# Patient Record
Sex: MALE | Race: ASIAN | URBAN - METROPOLITAN AREA
[De-identification: names, ages, dates, MRNs, and addresses within clinical notes are randomized per-mention and may not be internally consistent; named-entity substitution may affect disease eponyms.]

---

## 2020-08-06 ENCOUNTER — VIRTUAL VISIT (OUTPATIENT)
Dept: FAMILY MEDICINE | Facility: CLINIC | Age: 30
End: 2020-08-06
Payer: COMMERCIAL

## 2020-08-06 VITALS
RESPIRATION RATE: 12 BRPM | TEMPERATURE: 98.3 F | DIASTOLIC BLOOD PRESSURE: 79 MMHG | SYSTOLIC BLOOD PRESSURE: 114 MMHG | HEART RATE: 78 BPM | OXYGEN SATURATION: 98 %

## 2020-08-06 DIAGNOSIS — Z20.822 EXPOSURE TO COVID-19 VIRUS: Primary | ICD-10-CM

## 2020-08-06 DIAGNOSIS — Z20.822 SUSPECTED 2019 NOVEL CORONAVIRUS INFECTION: ICD-10-CM

## 2020-08-06 DIAGNOSIS — R05.9 COUGH: Primary | ICD-10-CM

## 2020-08-06 LAB
COVID-19 VIRUS PCR TO U OF MN - SOURCE: NORMAL
SARS-COV-2 RNA SPEC QL NAA+PROBE: NOT DETECTED

## 2020-08-06 RX ORDER — IBUPROFEN 200 MG
200 TABLET ORAL EVERY 4 HOURS PRN
COMMUNITY

## 2020-08-06 NOTE — PROGRESS NOTES
COVID-19 PCR test completed. Patient handout For Patients Who Have Been Tested for Covid-19 (Coronavirus) was given to the patient, which includes test result notification process.    COVID-19 PCR test completed. Patient handout For Patients Who Have Been Tested for Covid-19 (Coronavirus) was given to the patient, which includes test result notification process.

## 2020-08-06 NOTE — LETTER
August 11, 2020      Theresa Capone  [address]  [place] [address]        Dear ,    We are writing to inform you of your test results.        Resulted Orders   COVID-19 Virus PCR MRF ([facility])   Result Value Ref Range    COVID-19 Virus PCR to [facility] - Source Nasopharyngeal     COVID-19 Virus PCR to [facility] - Result Not Detected       Comment:      Collection of multiple specimens from the same patient may be necessary to  detect the virus. The possibility of a false negative should be considered if  the patient's recent exposure or clinical presentation suggests 2019 nCOV  infection and diagnostic tests for other causes of illness are negative.   Repeat  testing may be considered in this setting.  Viral RNA was extracted via a validated method and subsequently underwent  single step reverse transcriptase-real time polymerase chain reaction using  primers to the CDC specified N1,N2 gene targets of CoV2 and human RNP as an  internal control.  A negative result does not rule out the presence of real-time PCR inhibitors   in  the specimen or COVID-19 RNA in concentrations below the limit of detection of  the assay. The possibility of a false negative should be considered if the  patients recent exposure or clinical presentation suggests COVID-19.   Additional  testing or repeat testing requires consultation with the laboratory.  Andrey  opharyngeal specimen is the preferred choice for swab-based SARS CoV2  testing. When collection of a nasopharyngeal swab is not possible the   following  are acceptable alternatives:  an oropharyngeal (OP) specimen collected by a healthcare professional, or a  nasal mid-turbinate (NMT) swab collected by a healthcare professional or by  onsite self-collection (using a flocked tapered swab), or an anterior nares  specimen collected by a healthcare professional or by onsite self-collection  (using a round foam swab). (Centers for Disease Control)  Testing performed by [facility]  [facility], Room [address]210, [address]  [address] [place] [address], [place] [address]. This test was developed and its  performance characteristics determined by the [facility]. It has not been cleared or approved by the FDA.  The laboratory is regulated under the Clinical Laboratory Improvement  Amendments of 1988 (CLIA-88) as qualified to perform high-complexity testing.  This test i  s used for clinical purposes. It should not be regarded as  investigational or for research.  Performed and/or entered by:  [place] [address]       Narrative    Test performed by:  [facility] [employer] [facility]  [address], [place] [address]       If you have any questions or concerns, please call the clinic at the number listed above.       Sincerely,        Cynthia Wood MD

## 2020-08-06 NOTE — PROGRESS NOTES
"Family Medicine Telephone Visit Note               Telephone Visit Consent   Patient was verbally read the following and verbal consent was obtained.    \"Telephone visits are billed at different rates depending on your insurance coverage. During this emergency period, for some insurers they may be billed the same as an in-person visit.  Please reach out to your insurance provider with any questions.  If during the course of the call the physician/provider feels a telephone visit is not appropriate, you will not be charged for this service.\"    Name person giving consent:  Patient   Date verbal consent given:  8/6/2020  Time verbal consent given:  10:27 AM           Chief Complaint   Patient presents with     Cough     Sore throat.  No fever                     HPI   Patients name: Theresa  Appointment start time:  10:28 AM    Theresa Capone is a 29 year old male with no significant past medical history presenting via telephone visit with concerns for COVID-19. He reports that he has had a sore throat with coughing and a fever for the past two weeks along with nasal congestion. His son, who is 7 months old has similar symptoms. No known contact with COVID-19. Wife told him to quarantine and called to test. Has been staying out of work for the last two weeks. Works in [employer] which is less than 30 people. Able to eat and drink. Temperature has been around 98F. Coughing has been improving. No trouble breathing. No seasonal allergies. Has been trying tylenol and no improvement.        Current Outpatient Medications   Medication Sig Dispense Refill     ibuprofen (ADVIL/MOTRIN) 200 MG tablet Take 200 mg by mouth every 4 hours as needed for mild pain       No Known Allergies             Physical Exam:     There were no vitals taken for this visit.  There is no height or weight on file to calculate BMI.    Exam:  Constitutional: healthy, alert and no distress  Lungs: Sounds congested on the phone  Psychiatric: mentation appears " normal and affect normal/bright            Assessment and Plan   1. Cough  2. Suspected 2019 novel coronavirus infection  Has had URI symptoms ongoing for the past two weeks. Cough is improving. No known fevers. Needs COVID-19 testing before going back to work. Discussed that this may be likely due to another type of virus or be seasonal allergies given a bad ragweed/pollen season. Counseled on supportive cares. Will be seen in [facility] at 1:30pm today.   - Symptomatic COVID-19 Virus (Coronavirus) by PCR    Refilled medications that would be required in the next 3 months.     After Visit Information:  Patient declined AVS     No follow-ups on file.    Appointment end time: 10:39 AM  This is a telephone visit that took 11 minutes.      Clinician location:  [facility]     Cynthia Wood MD PGY3  [facility]    I precepted today with Dr. Rama Luke.

## 2020-08-06 NOTE — PROGRESS NOTES
Preceptor Attestation:    I talked to the patient on the phone and discussed the patient with the resident. I have verified the content of the note, which accurately reflects my assessment of the patient and the plan of care.   Supervising Physician:  Rama Luke MD.

## 2020-08-11 NOTE — RESULT ENCOUNTER NOTE
Negative results relayed to patient over the phone. Please send a copy of the results to patient. He needs them for work.    Thank you,  Cynthia Wood MD PGY3  [facility]

## 2020-09-09 ENCOUNTER — VIRTUAL VISIT (OUTPATIENT)
Dept: FAMILY MEDICINE | Facility: CLINIC | Age: 30
End: 2020-09-09
Payer: COMMERCIAL

## 2020-09-09 VITALS
RESPIRATION RATE: 14 BRPM | TEMPERATURE: 98.8 F | HEART RATE: 56 BPM | DIASTOLIC BLOOD PRESSURE: 76 MMHG | OXYGEN SATURATION: 100 % | SYSTOLIC BLOOD PRESSURE: 115 MMHG

## 2020-09-09 VITALS — WEIGHT: 170 LBS | BODY MASS INDEX: 27.32 KG/M2 | HEIGHT: 66 IN

## 2020-09-09 DIAGNOSIS — Z20.822 SUSPECTED COVID-19 VIRUS INFECTION: ICD-10-CM

## 2020-09-09 DIAGNOSIS — Z20.822 SUSPECTED COVID-19 VIRUS INFECTION: Primary | ICD-10-CM

## 2020-09-09 LAB
COVID-19 VIRUS PCR TO U OF MN - SOURCE: NORMAL
SARS-COV-2 RNA SPEC QL NAA+PROBE: NOT DETECTED

## 2020-09-09 ASSESSMENT — MIFFLIN-ST. JEOR: SCORE: 1678.86

## 2020-09-09 NOTE — ADDENDUM NOTE
Addended by: GILLIAN LUNSFORD on: 9/9/2020 01:06 PM     Modules accepted: Orders     no abrasions, no jaundice, no lesions, no pruritis, and no rashes.

## 2020-09-09 NOTE — PROGRESS NOTES
Preceptor attestation:    I discussed the patient with the resident, and I spoke to the patient by telephone. I have verified the content of the note, which accurately reflects my assessment of the patient and the plan of care.    Supervising physician: Almita Michele MD  [facility]

## 2020-09-09 NOTE — PROGRESS NOTES
"Family Medicine Telephone Visit Note           Telephone Visit Consent   Patient was verbally read the following and verbal consent was obtained.    \"Telephone visits are billed at different rates depending on your insurance coverage. During this emergency period, for some insurers they may be billed the same as an in-person visit.  Please reach out to your insurance provider with any questions.  If during the course of the call the physician/provider feels a telephone visit is not appropriate, you will not be charged for this service.\"    Name person giving consent:  Patient   Date verbal consent given:  9/9/2020  Time verbal consent given:  9:20 AM       Chief Complaint   Patient presents with     Fever     started last Wednesday around 5pm, has really bad cold sweats     Headache     a bad migraine as well             HPI   Patients name: Theresa  Appointment start time:  9:27 AM    Social Determinants of Health Screening: Not addressed this visit     Fevers, chills and night sweats for 1 week. No cough, wheezing or SOB. No GI symptoms. No rhinorrhea. Does endorse myalgias. Works at a [employer]. No known COVID 19 exposure. Did have a negative test one month ago. Has been quarantining.     Current Outpatient Medications   Medication Sig Dispense Refill     ibuprofen (ADVIL/MOTRIN) 200 MG tablet Take 200 mg by mouth every 4 hours as needed for mild pain       No Known Allergies           Review of Systems:     Constitutional, HEENT, cardiovascular, pulmonary, GI, , musculoskeletal, neuro, skin, endocrine and psych systems are negative, except as otherwise noted.         Physical Exam:     There were no vitals taken for this visit.  There is no height or weight on file to calculate BMI.    Exam:  Constitutional: alert and no distress  Pulm: speaking in full sentences. No cough or wheezing  Psychiatric: mentation appears normal and affect normal/bright     Results from the last 24 hoursNo results found for this or any " previous visit (from the past 24 hour(s)).        Assessment and Plan   Theresa was seen today for fever and headache.    Diagnoses and all orders for this visit:    Suspected COVID-19 virus infection  1 week of fevers, chills, sweats and myalgias. No known exposure, but possible at work. Testing to be done later today.   -     COVID-19 Virus PCR MRF ([facility])    Refilled medications that would be required in the next 3 months.     After Visit Information:  Patient declined AVS     No follow-ups on file.    Appointment end time: 9:39 AM  This is a telephone visit that took 12 minutes.    Clinician location:  [facility]    Rashawn Le MD  I precepted today with Dr. Michele.

## 2020-09-11 NOTE — RESULT ENCOUNTER NOTE
Patient informed of negative results. >72 hours without symptoms prior to returning to work. Symptoms improved already. No questions.